# Patient Record
Sex: MALE | Employment: STUDENT | URBAN - METROPOLITAN AREA
[De-identification: names, ages, dates, MRNs, and addresses within clinical notes are randomized per-mention and may not be internally consistent; named-entity substitution may affect disease eponyms.]

---

## 2019-08-12 ENCOUNTER — TELEPHONE (OUTPATIENT)
Dept: PSYCHIATRY | Facility: CLINIC | Age: 29
End: 2019-08-12

## 2019-08-12 NOTE — TELEPHONE ENCOUNTER
PSYCHIATRY CLINIC PHONE INTAKE     SERVICES REQUESTED / INTERESTED IN          Med Management    Presenting Problem and Brief History                              What would you like to be seen for? (brief description):  The patient studies at [facility]. He came back home to [place] on Saturday night and his sister said that he was acting different, his thoughts were disorganized, and he was having difficulty focusing. He told her that he has been having paranoid delusions and hearing voices for the past 3+ months. He has never experienced anything like this before.    Have you received a mental health diagnosis? No     Are you currently seeing a mental health provider?  No              Do you have mental health records elsewhere?  No    Have you ever been hospitalized for psychiatric reasons?  No      Do you have current thoughts of self-harm?  No    Do you currently have thoughts of harming others?  No       Substance Use History     Do you have any history of alcohol / illicit drug use?  No       Social History     Do you have any current or past legal issues?  No    OK to leave a detailed voicemail?  Yes    Medical/ Surgical History                                  None     Medications             None    DISPOSITION      Completed phone screen with patient's sister and scheduled Psychosis Evaluation. Sent a message to [facility] asking them to add the pt to the cancellation list (per sister's request). Provided her with walk-in clinic numbers.    -Omiara Fonseca,